# Patient Record
Sex: MALE
[De-identification: names, ages, dates, MRNs, and addresses within clinical notes are randomized per-mention and may not be internally consistent; named-entity substitution may affect disease eponyms.]

---

## 2018-01-05 ENCOUNTER — CHARTING TRANS (OUTPATIENT)
Dept: OTHER | Age: 28
End: 2018-01-05

## 2018-01-05 ENCOUNTER — LAB SERVICES (OUTPATIENT)
Dept: OTHER | Age: 28
End: 2018-01-05

## 2018-01-05 LAB
FLU A AG RAPID SCREEN: POSITIVE
FLU B AG RAPID SCREEN: NEGATIVE
FLU RAPID SCREEN: POSITIVE
RAPID STREP GROUP A: NORMAL

## 2018-11-02 VITALS
HEIGHT: 73 IN | HEART RATE: 88 BPM | DIASTOLIC BLOOD PRESSURE: 68 MMHG | SYSTOLIC BLOOD PRESSURE: 102 MMHG | WEIGHT: 210 LBS | BODY MASS INDEX: 27.83 KG/M2 | TEMPERATURE: 98.9 F

## 2022-10-27 ENCOUNTER — APPOINTMENT (OUTPATIENT)
Dept: URBAN - METROPOLITAN AREA CLINIC 248 | Age: 32
Setting detail: DERMATOLOGY
End: 2022-10-27

## 2022-10-27 DIAGNOSIS — Z41.1 ENCOUNTER FOR COSMETIC SURGERY: ICD-10-CM

## 2022-10-27 PROCEDURE — OTHER CONSULTATION - COSMETIC GYNECOMASTIA: OTHER

## 2022-10-27 NOTE — PROCEDURE: CONSULTATION - COSMETIC GYNECOMASTIA
Consultation Charge (Use Numbers Only, No Special Characters Or $): 100
Detail Level: Simple
Count Minor/Major Decisions Toward Mdm (Not Cosmetic)?: No

## 2023-08-03 ENCOUNTER — APPOINTMENT (OUTPATIENT)
Dept: URBAN - METROPOLITAN AREA CLINIC 248 | Age: 33
Setting detail: DERMATOLOGY
End: 2023-08-03

## 2023-08-03 DIAGNOSIS — Z41.1 ENCOUNTER FOR COSMETIC SURGERY: ICD-10-CM

## 2023-08-03 PROCEDURE — OTHER CONSULTATION - BREAST REDUCTION: OTHER

## 2023-08-03 NOTE — PROCEDURE: CONSULTATION - BREAST REDUCTION
Consultation Charge $ (Use Numbers Only, No Text Please.): 150
Detail Level: Detailed
Count Minor/Major Decisions Toward Mdm (Not Cosmetic)?: No